# Patient Record
Sex: FEMALE | URBAN - METROPOLITAN AREA
[De-identification: names, ages, dates, MRNs, and addresses within clinical notes are randomized per-mention and may not be internally consistent; named-entity substitution may affect disease eponyms.]

---

## 2020-01-27 ENCOUNTER — APPOINTMENT (OUTPATIENT)
Age: 55
Setting detail: DERMATOLOGY
End: 2020-01-29

## 2020-01-27 DIAGNOSIS — L81.4 OTHER MELANIN HYPERPIGMENTATION: ICD-10-CM

## 2020-01-27 DIAGNOSIS — L40.0 PSORIASIS VULGARIS: ICD-10-CM

## 2020-01-27 DIAGNOSIS — L72.0 EPIDERMAL CYST: ICD-10-CM

## 2020-01-27 PROCEDURE — OTHER INTRALESIONAL KENALOG: OTHER

## 2020-01-27 PROCEDURE — OTHER COUNSELING: TOPICAL STEROIDS: OTHER

## 2020-01-27 PROCEDURE — OTHER REASSURANCE: OTHER

## 2020-01-27 PROCEDURE — 11900 INJECT SKIN LESIONS </W 7: CPT

## 2020-01-27 PROCEDURE — OTHER CONSULTATION EXCISION: OTHER

## 2020-01-27 PROCEDURE — OTHER COUNSELING: OTHER

## 2020-01-27 PROCEDURE — 99203 OFFICE O/P NEW LOW 30 MIN: CPT | Mod: 25

## 2020-01-27 PROCEDURE — OTHER DIAGNOSIS COMMENT: OTHER

## 2020-01-27 PROCEDURE — OTHER PRESCRIPTION: OTHER

## 2020-01-27 PROCEDURE — OTHER MIPS QUALITY: OTHER

## 2020-01-27 RX ORDER — CLOBETASOL PROPIONATE 0.5 MG/ML
SPRAY TOPICAL
Qty: 1 | Refills: 4 | Status: ERX | COMMUNITY
Start: 2020-01-27

## 2020-01-27 ASSESSMENT — LOCATION DETAILED DESCRIPTION DERM
LOCATION DETAILED: RIGHT MEDIAL BREAST 2-3:00 REGION
LOCATION DETAILED: LEFT INFERIOR OCCIPITAL SCALP
LOCATION DETAILED: RIGHT LATERAL SUPERIOR CHEST
LOCATION DETAILED: RIGHT OCCIPITAL SCALP
LOCATION DETAILED: LEFT OCCIPITAL SCALP
LOCATION DETAILED: LEFT SUPERIOR POSTERIOR NECK
LOCATION DETAILED: LEFT LATERAL SUPERIOR CHEST
LOCATION DETAILED: PERIUMBILICAL SKIN
LOCATION DETAILED: LEFT INFERIOR POSTAURICULAR SKIN
LOCATION DETAILED: LEFT POSTAURICULAR SKIN
LOCATION DETAILED: MID-OCCIPITAL SCALP

## 2020-01-27 ASSESSMENT — LOCATION SIMPLE DESCRIPTION DERM
LOCATION SIMPLE: POSTERIOR NECK
LOCATION SIMPLE: POSTERIOR SCALP
LOCATION SIMPLE: RIGHT BREAST
LOCATION SIMPLE: ABDOMEN
LOCATION SIMPLE: CHEST

## 2020-01-27 ASSESSMENT — LOCATION ZONE DERM
LOCATION ZONE: TRUNK
LOCATION ZONE: NECK
LOCATION ZONE: SCALP

## 2020-01-27 NOTE — PROCEDURE: DIAGNOSIS COMMENT
Comment: Pt reports she also has in her vulva but declines examination of today. Pt currently on Humira for Crohns but reports this has not helped her psoriasis and is not helping her crohns anymore. Pt waiting for insurance company to approve stelara for her to switch to for her crohns. Feel this will also benefit her psoriasis. Pt reports she currently only has topical clobetasol foam for her scalp which only last her 1 week/month before it runs out and insurance will only let her fill once a month. Pt reports she had clobex spray in the past which worked much better and would last her at least three months but pt reports her insurance stopped covering. Will send to [facility] to see if we cannot get for cheaper for pt Comment: Pt reports she also has in her vulva but declines examination of today. Pt currently on Humira for Crohns but reports this has not helped her psoriasis and is not helping her crohns anymore. Pt waiting for insurance company to approve stelara for her to switch to for her crohns. Feel this will also benefit her psoriasis. Pt reports she currently only has topical clobetasol foam for her scalp which only last her 1 week/month before it runs out and insurance will only let her fill once a month. Pt reports she had clobex spray in the past which worked much better and would last her at least three months but pt reports her insurance stopped covering. Will send to [facility] to see if we cannot get for cheaper for pt

## 2020-01-27 NOTE — PROCEDURE: DIAGNOSIS COMMENT
Comment: Informed pt she can call [facility] if we cannot get her in within the next couple of weeks to schedule excision

## 2020-01-27 NOTE — PROCEDURE: MIPS QUALITY
Name And Contact Information For Health Care Proxy: Remi Nunez - ; [phone] Name And Contact Information For Health Care Proxy: Remi Nunez - ; [phone]

## 2020-02-27 ENCOUNTER — RX ONLY (RX ONLY)
Age: 55
End: 2020-02-27

## 2020-03-06 ENCOUNTER — APPOINTMENT (OUTPATIENT)
Age: 55
Setting detail: DERMATOLOGY
End: 2020-03-06

## 2020-03-06 DIAGNOSIS — L72.0 EPIDERMAL CYST: ICD-10-CM

## 2020-03-06 PROBLEM — D48.5 NEOPLASM OF UNCERTAIN BEHAVIOR OF SKIN: Status: ACTIVE | Noted: 2020-03-06

## 2020-03-06 PROCEDURE — OTHER MIPS QUALITY: OTHER

## 2020-03-06 PROCEDURE — OTHER TREATMENT REGIMEN: OTHER

## 2020-03-06 PROCEDURE — OTHER ORDER TESTS: OTHER

## 2020-03-06 PROCEDURE — OTHER PRESCRIPTION: OTHER

## 2020-03-06 PROCEDURE — OTHER COUNSELING: OTHER

## 2020-03-06 PROCEDURE — 99214 OFFICE O/P EST MOD 30 MIN: CPT

## 2020-03-06 RX ORDER — MUPIROCIN 20 MG/G
OINTMENT TOPICAL
Qty: 1 | Refills: 0 | Status: ERX | COMMUNITY
Start: 2020-03-06

## 2020-03-06 NOTE — PROCEDURE: TREATMENT REGIMEN
Otc Regimen: Hibeclense wash 1 x per day to area
Detail Level: Simple
Plan: pulled packing today, minimal exudate present

## 2020-03-06 NOTE — PROCEDURE: MIPS QUALITY
Name And Contact Information For Health Care Proxy: Remi Nunez - ; [phone] Name And Contact Information For Health Care Proxy: Remi Nunez - ; [phone]

## 2020-03-13 ENCOUNTER — APPOINTMENT (OUTPATIENT)
Age: 55
Setting detail: DERMATOLOGY
End: 2020-03-18

## 2020-03-13 DIAGNOSIS — L72.0 EPIDERMAL CYST: ICD-10-CM

## 2020-03-13 PROBLEM — D48.5 NEOPLASM OF UNCERTAIN BEHAVIOR OF SKIN: Status: ACTIVE | Noted: 2020-03-13

## 2020-03-13 PROCEDURE — 99213 OFFICE O/P EST LOW 20 MIN: CPT

## 2020-03-13 PROCEDURE — OTHER MIPS QUALITY: OTHER

## 2020-03-13 PROCEDURE — OTHER COUNSELING: OTHER

## 2020-03-13 PROCEDURE — OTHER TREATMENT REGIMEN: OTHER

## 2020-03-13 ASSESSMENT — LOCATION SIMPLE DESCRIPTION DERM
LOCATION SIMPLE: CHEST
LOCATION SIMPLE: CHEST

## 2020-03-13 ASSESSMENT — LOCATION ZONE DERM
LOCATION ZONE: TRUNK
LOCATION ZONE: TRUNK

## 2020-03-13 ASSESSMENT — LOCATION DETAILED DESCRIPTION DERM
LOCATION DETAILED: RIGHT LATERAL SUPERIOR CHEST
LOCATION DETAILED: RIGHT MEDIAL SUPERIOR CHEST

## 2020-03-13 NOTE — PROCEDURE: MIPS QUALITY
Name And Contact Information For Health Care Proxy: Remi Nunez - ; [phone] Name And Contact Information For Health Care Proxy: Remi Nunez - ; [phone]

## 2020-03-13 NOTE — PROCEDURE: TREATMENT REGIMEN
Plan: T/c ILK next month
Detail Level: Simple
Continue Regimen: Finish up your prior prescribed Keflex prescription, finish up last day of twice daily Bactrim, start once daily Bactrim times one month, return in one month to consider ILK and/or course of doxycycline

## 2020-04-13 ENCOUNTER — APPOINTMENT (OUTPATIENT)
Age: 55
Setting detail: DERMATOLOGY
End: 2020-04-21

## 2020-04-13 DIAGNOSIS — D22 MELANOCYTIC NEVI: ICD-10-CM

## 2020-04-13 DIAGNOSIS — L72.0 EPIDERMAL CYST: ICD-10-CM

## 2020-04-13 DIAGNOSIS — D485 NEOPLASM OF UNCERTAIN BEHAVIOR OF SKIN: ICD-10-CM

## 2020-04-13 DIAGNOSIS — D18.0 HEMANGIOMA: ICD-10-CM

## 2020-04-13 DIAGNOSIS — L82.0 INFLAMED SEBORRHEIC KERATOSIS: ICD-10-CM

## 2020-04-13 PROBLEM — D22.39 MELANOCYTIC NEVI OF OTHER PARTS OF FACE: Status: ACTIVE | Noted: 2020-04-13

## 2020-04-13 PROBLEM — D48.5 NEOPLASM OF UNCERTAIN BEHAVIOR OF SKIN: Status: ACTIVE | Noted: 2020-04-13

## 2020-04-13 PROBLEM — D18.01 HEMANGIOMA OF SKIN AND SUBCUTANEOUS TISSUE: Status: ACTIVE | Noted: 2020-04-13

## 2020-04-13 PROCEDURE — OTHER SHAVE REMOVAL: OTHER

## 2020-04-13 PROCEDURE — 11305 SHAVE SKIN LESION 0.5 CM/<: CPT | Mod: 59

## 2020-04-13 PROCEDURE — 17110 DESTRUCT B9 LESION 1-14: CPT

## 2020-04-13 PROCEDURE — OTHER TREATMENT REGIMEN: OTHER

## 2020-04-13 PROCEDURE — OTHER ADDITIONAL NOTES: OTHER

## 2020-04-13 PROCEDURE — OTHER COUNSELING: OTHER

## 2020-04-13 PROCEDURE — OTHER MIPS QUALITY: OTHER

## 2020-04-13 PROCEDURE — OTHER LIQUID NITROGEN: OTHER

## 2020-04-13 PROCEDURE — 99213 OFFICE O/P EST LOW 20 MIN: CPT | Mod: 25

## 2020-04-13 ASSESSMENT — LOCATION DETAILED DESCRIPTION DERM
LOCATION DETAILED: LEFT MEDIAL SUPERIOR CHEST
LOCATION DETAILED: LEFT LATERAL TRAPEZIAL NECK
LOCATION DETAILED: RIGHT INFERIOR FOREHEAD
LOCATION DETAILED: RIGHT LATERAL SUPERIOR CHEST

## 2020-04-13 ASSESSMENT — LOCATION SIMPLE DESCRIPTION DERM
LOCATION SIMPLE: CHEST
LOCATION SIMPLE: RIGHT FOREHEAD
LOCATION SIMPLE: POSTERIOR NECK
LOCATION SIMPLE: CHEST

## 2020-04-13 ASSESSMENT — LOCATION ZONE DERM
LOCATION ZONE: TRUNK
LOCATION ZONE: FACE
LOCATION ZONE: TRUNK
LOCATION ZONE: NECK

## 2020-04-13 NOTE — PROCEDURE: LIQUID NITROGEN
Render Note In Bullet Format When Appropriate: No
Post-Care Instructions: I reviewed with the patient in detail post-care instructions. Patient is to wear sunprotection, and avoid picking at any of the treated lesions. Pt may apply Vaseline to crusted or scabbing areas.
Duration Of Freeze Thaw-Cycle (Seconds): 5-10
Number Of Freeze-Thaw Cycles: 2 freeze-thaw cycles
Detail Level: Simple
Medical Necessity Information: It is in your best interest to select a reason for this procedure from the list below. All of these items fulfill various CMS LCD requirements except the new and changing color options.
Medical Necessity Clause: This procedure was medically necessary because the lesions that were treated were:
Consent: The patient's consent was obtained including but not limited to risks of crusting, scabbing, blistering, scarring, darker or lighter pigmentary change, recurrence, incomplete removal and infection.

## 2020-04-13 NOTE — PROCEDURE: TREATMENT REGIMEN
Detail Level: Simple
Plan: If starts to enlarge or bleed for no reason will bx In f/u
Plan: T/c ILK at next visit in 6 weeks

## 2020-04-13 NOTE — PROCEDURE: MIPS QUALITY
Name And Contact Information For Health Care Proxy: Remi Nunez - ; [phone] Name And Contact Information For Health Care Proxy: Remi Nunez - ; [phone]

## 2020-04-13 NOTE — PROCEDURE: SHAVE REMOVAL
Anesthesia Type: 1% lidocaine with epinephrine
Medical Necessity Clause: This procedure was medically necessary because the lesion that was treated was:
Add Variable For Additional Medical Justification: No
Hemostasis: Aluminum Chloride and Electrocautery
Medical Necessity Information: It is in your best interest to select a reason for this procedure from the list below. All of these items fulfill various CMS LCD requirements except the new and changing color options.
Notification Instructions: Patient will be notified of biopsy results. However, patient instructed to call the office if not contacted within 2 weeks.
Billing Type: Third-Party Bill
Size Of Lesion In Cm (Required): 0.5
Wound Care: Petrolatum
X Size Of Lesion In Cm (Optional): 0
Biopsy Method: Dermablade
Consent was obtained from the patient. The risks and benefits to therapy were discussed in detail. Specifically, the risks of infection, scarring, bleeding, prolonged wound healing, incomplete removal, allergy to anesthesia, nerve injury and recurrence were addressed. Prior to the procedure, the treatment site was clearly identified and confirmed by the patient. All components of Universal Protocol/PAUSE Rule completed.
Anesthesia Volume In Cc: 2
Post-Care Instructions: I reviewed with the patient in detail post-care instructions. Patient is to keep the biopsy site dry overnight, and then apply bacitracin twice daily until healed. Patient may apply hydrogen peroxide soaks to remove any crusting.
Detail Level: Detailed
Path Notes (To The Dermatopathologist): Please check margins.
Was A Bandage Applied: Yes

## 2024-10-21 ENCOUNTER — NEW PATIENT (OUTPATIENT)
Dept: URBAN - METROPOLITAN AREA CLINIC 98 | Facility: CLINIC | Age: 59
End: 2024-10-21

## 2024-10-21 DIAGNOSIS — H20.9: ICD-10-CM

## 2024-10-21 DIAGNOSIS — H21.542: ICD-10-CM

## 2024-10-21 PROCEDURE — 99204 OFFICE O/P NEW MOD 45 MIN: CPT

## 2024-10-21 PROCEDURE — 92134 CPTRZ OPH DX IMG PST SGM RTA: CPT | Mod: NC

## 2024-10-21 PROCEDURE — 92202 OPSCPY EXTND ON/MAC DRAW: CPT | Mod: NC

## 2024-10-21 ASSESSMENT — TONOMETRY
OS_IOP_MMHG: 22
OD_IOP_MMHG: 21

## 2024-10-21 ASSESSMENT — VISUAL ACUITY
OS_SC: 20/50+3
OS_SC: 20/40
OD_SC: 20/30
OD_SC: 20/20

## 2024-11-04 ENCOUNTER — FOLLOW UP (OUTPATIENT)
Dept: URBAN - METROPOLITAN AREA CLINIC 98 | Facility: CLINIC | Age: 59
End: 2024-11-04

## 2024-11-04 DIAGNOSIS — H20.9: ICD-10-CM

## 2024-11-04 DIAGNOSIS — H21.542: ICD-10-CM

## 2024-11-04 PROCEDURE — 92134 CPTRZ OPH DX IMG PST SGM RTA: CPT | Mod: NC

## 2024-11-04 PROCEDURE — 92012 INTRM OPH EXAM EST PATIENT: CPT

## 2024-11-04 ASSESSMENT — VISUAL ACUITY
OS_SC: 20/40-1
OD_SC: 20/20
OS_PH: 20/20-2

## 2024-11-04 ASSESSMENT — TONOMETRY
OS_IOP_MMHG: 16
OD_IOP_MMHG: 18